# Patient Record
Sex: FEMALE | Race: WHITE | NOT HISPANIC OR LATINO | URBAN - METROPOLITAN AREA
[De-identification: names, ages, dates, MRNs, and addresses within clinical notes are randomized per-mention and may not be internally consistent; named-entity substitution may affect disease eponyms.]

---

## 2017-02-24 ENCOUNTER — EMERGENCY (EMERGENCY)
Facility: HOSPITAL | Age: 62
LOS: 1 days | Discharge: HOME | End: 2017-02-24
Attending: EMERGENCY MEDICINE | Admitting: EMERGENCY MEDICINE
Payer: COMMERCIAL

## 2017-02-24 VITALS
SYSTOLIC BLOOD PRESSURE: 141 MMHG | OXYGEN SATURATION: 100 % | RESPIRATION RATE: 18 BRPM | TEMPERATURE: 98 F | DIASTOLIC BLOOD PRESSURE: 72 MMHG | HEART RATE: 97 BPM

## 2017-02-24 LAB
ALBUMIN SERPL ELPH-MCNC: 4.1 G/DL — SIGNIFICANT CHANGE UP (ref 3.3–5)
ALP SERPL-CCNC: 51 U/L — SIGNIFICANT CHANGE UP (ref 40–120)
ALT FLD-CCNC: 20 U/L — SIGNIFICANT CHANGE UP (ref 4–33)
AST SERPL-CCNC: 25 U/L — SIGNIFICANT CHANGE UP (ref 4–32)
BASOPHILS # BLD AUTO: 0.02 K/UL — SIGNIFICANT CHANGE UP (ref 0–0.2)
BASOPHILS NFR BLD AUTO: 0.2 % — SIGNIFICANT CHANGE UP (ref 0–2)
BILIRUB SERPL-MCNC: 0.2 MG/DL — SIGNIFICANT CHANGE UP (ref 0.2–1.2)
BUN SERPL-MCNC: 33 MG/DL — HIGH (ref 7–23)
CALCIUM SERPL-MCNC: 9.7 MG/DL — SIGNIFICANT CHANGE UP (ref 8.4–10.5)
CHLORIDE SERPL-SCNC: 104 MMOL/L — SIGNIFICANT CHANGE UP (ref 98–107)
CO2 SERPL-SCNC: 23 MMOL/L — SIGNIFICANT CHANGE UP (ref 22–31)
CREAT SERPL-MCNC: 1.09 MG/DL — SIGNIFICANT CHANGE UP (ref 0.5–1.3)
EOSINOPHIL # BLD AUTO: 0.02 K/UL — SIGNIFICANT CHANGE UP (ref 0–0.5)
EOSINOPHIL NFR BLD AUTO: 0.2 % — SIGNIFICANT CHANGE UP (ref 0–6)
GLUCOSE SERPL-MCNC: 119 MG/DL — HIGH (ref 70–99)
HCT VFR BLD CALC: 35.9 % — SIGNIFICANT CHANGE UP (ref 34.5–45)
HGB BLD-MCNC: 11.8 G/DL — SIGNIFICANT CHANGE UP (ref 11.5–15.5)
IMM GRANULOCYTES NFR BLD AUTO: 0.3 % — SIGNIFICANT CHANGE UP (ref 0–1.5)
LYMPHOCYTES # BLD AUTO: 2.45 K/UL — SIGNIFICANT CHANGE UP (ref 1–3.3)
LYMPHOCYTES # BLD AUTO: 25.8 % — SIGNIFICANT CHANGE UP (ref 13–44)
MCHC RBC-ENTMCNC: 29.1 PG — SIGNIFICANT CHANGE UP (ref 27–34)
MCHC RBC-ENTMCNC: 32.9 % — SIGNIFICANT CHANGE UP (ref 32–36)
MCV RBC AUTO: 88.4 FL — SIGNIFICANT CHANGE UP (ref 80–100)
MONOCYTES # BLD AUTO: 0.4 K/UL — SIGNIFICANT CHANGE UP (ref 0–0.9)
MONOCYTES NFR BLD AUTO: 4.2 % — SIGNIFICANT CHANGE UP (ref 2–14)
NEUTROPHILS # BLD AUTO: 6.58 K/UL — SIGNIFICANT CHANGE UP (ref 1.8–7.4)
NEUTROPHILS NFR BLD AUTO: 69.3 % — SIGNIFICANT CHANGE UP (ref 43–77)
PLATELET # BLD AUTO: 237 K/UL — SIGNIFICANT CHANGE UP (ref 150–400)
PMV BLD: 11.3 FL — SIGNIFICANT CHANGE UP (ref 7–13)
POTASSIUM SERPL-MCNC: 4.1 MMOL/L — SIGNIFICANT CHANGE UP (ref 3.5–5.3)
POTASSIUM SERPL-SCNC: 4.1 MMOL/L — SIGNIFICANT CHANGE UP (ref 3.5–5.3)
PROT SERPL-MCNC: 7.1 G/DL — SIGNIFICANT CHANGE UP (ref 6–8.3)
RBC # BLD: 4.06 M/UL — SIGNIFICANT CHANGE UP (ref 3.8–5.2)
RBC # FLD: 13.2 % — SIGNIFICANT CHANGE UP (ref 10.3–14.5)
SODIUM SERPL-SCNC: 141 MMOL/L — SIGNIFICANT CHANGE UP (ref 135–145)
WBC # BLD: 9.5 K/UL — SIGNIFICANT CHANGE UP (ref 3.8–10.5)
WBC # FLD AUTO: 9.5 K/UL — SIGNIFICANT CHANGE UP (ref 3.8–10.5)

## 2017-02-24 PROCEDURE — 99284 EMERGENCY DEPT VISIT MOD MDM: CPT

## 2017-02-24 NOTE — ED PROVIDER NOTE - MEDICAL DECISION MAKING DETAILS
60 y/o F pt presenting with acute hearing loss x2 days. Concerning for medication side effect vs infection.

## 2017-02-24 NOTE — ED PROVIDER NOTE - PROGRESS NOTE DETAILS
Kenneth Parks MD PGY2: The scribe's documentation has been prepared under my direction and personally reviewed by me in its entirety. I confirm that the note above accurately reflects all work, treatment, procedures, and medical decision making performed by me. Seen by ENT who recommended c/w steroids at home, f/u with ENT on Monday, will d/c

## 2017-02-24 NOTE — ED PROVIDER NOTE - NS ED MD SCRIBE ATTENDING SCRIBE SECTIONS
PHYSICAL EXAM/VITAL SIGNS( Pullset)/HISTORY OF PRESENT ILLNESS/REVIEW OF SYSTEMS/HIV/DISPOSITION/PAST MEDICAL/SURGICAL/SOCIAL HISTORY

## 2017-02-24 NOTE — ED PROVIDER NOTE - NEURO NEGATIVE STATEMENT, MLM
no loss of consciousness, no gait abnormality, no headache, no sensory deficits, and no weakness. No numbness, no dizziness and no difficulty walking.

## 2017-02-24 NOTE — ED ADULT TRIAGE NOTE - CHIEF COMPLAINT QUOTE
Pt c/o hearing loss since wednesday was given medication in [facility] with no relief, pt states unable to hear. Pt was given cipro ear drops and prednisone. Pt denies any pain.

## 2017-02-24 NOTE — ED PROVIDER NOTE - ATTENDING CONTRIBUTION TO CARE
SHANITA Dumont MD: Patient seen and evaluated, presents to the ED with 2 days of left ear hearing loss, happened suddenly, no pain or trauma, no congestion, no numbness or weakness, no other complaints. Pt went to urgent care and was given prednisone and cipro drops, but it still continues. PE right TM dull with some visible vessels. Will get ENT to see patient.

## 2017-02-24 NOTE — ED PROVIDER NOTE - OBJECTIVE STATEMENT
60 y/o F pt with no significant PMHx presents to the ED for acute hearing loss x2 days. States waking up Wednesday morning and couldn't hear out of right ear. Patient was evaluated at an urgent care and prescribed steroids and Cipro ear drops which provided no relief. Patient continues to have hearing loss of right ear. Denies fever, chills, cough, CP, SOB, HA, visual changes, weakness, numbness, dizziness, difficulty walking or any other complaints. 62 y/o F pt with no significant PMHx presents to the ED for acute hearing loss x2 days. States waking up Wednesday morning and couldn't hear out of right ear. Patient was evaluated at an urgent care and prescribed steroids and Cipro ear drops which provided no relief. Patient continues to have hearing loss of right ear. Denies fever, chills, cough, CP, SOB, HA, visual changes, weakness, numbness, dizziness, difficulty walking, aspirin use or any other complaints.

## 2017-02-24 NOTE — ED PROVIDER NOTE - CHPI ED SYMPTOMS NEG
no weakness/no cough, no CP, no SOB, no HA, no visual changes, no dizziness, no difficulty walking/no numbness/no fever/no chills

## 2022-10-05 NOTE — ED ADULT TRIAGE NOTE - MODE OF ARRIVAL
Orders for admission, patient is aware of plan and ready to go upstairs. Any questions, please call ED RN Pinky Eric at extension [phone].      Patient Covid vaccination status: Unvaccinated     COVID Test Ordered in ED: Rapid SARS-CoV-2 by PCR    COVID Suspicion at Admission: Low clinical suspicion for COVID    Running Infusions:      Mental Status/LOC at time of transport: AOX3    Other pertinent information:   CIWA score: N/A   NIH score:  N/A Private Vehicle

## 2022-11-16 ENCOUNTER — APPOINTMENT (OUTPATIENT)
Dept: ORTHOPEDIC SURGERY | Facility: CLINIC | Age: 67
End: 2022-11-16

## 2022-11-16 PROBLEM — Z00.00 ENCOUNTER FOR PREVENTIVE HEALTH EXAMINATION: Status: ACTIVE | Noted: 2022-11-16

## 2022-11-16 PROCEDURE — 99203 OFFICE O/P NEW LOW 30 MIN: CPT | Mod: 25

## 2022-11-16 PROCEDURE — 73564 X-RAY EXAM KNEE 4 OR MORE: CPT | Mod: RT

## 2022-11-16 PROCEDURE — 20610 DRAIN/INJ JOINT/BURSA W/O US: CPT | Mod: LT

## 2022-11-16 NOTE — IMAGING
[de-identified] : RIGHT Knee Exam\par Alignment:  Neutral \par Effusion: None\par Atrophy: None                                                \par Stable to Varus/valgus stress\par Posterior Drawer Test: negative\par Anterior Drawer Test: Negative\par Knee Extension/Flexion: 0 / 120\par \par Medial/lateral compartments\par Medial joint line: No Tenderness\par Lateral joint line: No Tenderness\par Leslie test: negative\par \par Patellofemoral joint\par Medial patellar facet: no tenderness\par Patellar grind: Negative\par \par Tendons:\par Pes Anserine: No tenderness\par Gerdy’s Tubercle/ IT Band: No tenderness\par Quadriceps Tendon: No Tenderness\par patellar tendon: no Tenderness\par Tibial tubercle: not tenderness\par Calf: no Tenderness\par \par Neurovascular exam\par Muscle strength: 5/5\par Sensation to light touch: intact\par Distal pulses: 2+\par Ligamentously stable \par \par LEFT Knee Exam\par Alignment: Neutral \par Effusion: None\par Atrophy: None                                                \par Stable to Varus/valgus stress\par Posterior Drawer Test: negative\par Anterior Drawer Test: Negative\par Knee Extension/Flexion: 0 / 120\par \par Medial/lateral compartments\par Medial joint line: POS Tenderness\par Lateral joint line: No Tenderness\par Leslie test: negative\par \par Patellofemoral joint\par Medial patellar facet: no tenderness\par Patellar grind: Negative\par \par Tendons:\par Pes Anserine: No tenderness\par Gerdy’s Tubercle/ IT Band: No tenderness\par Quadriceps Tendon: No Tenderness\par patellar tendon: no Tenderness\par Tibial tubercle: not tenderness\par Calf: no Tenderness\par \par Neurovascular exam\par Muscle strength: 5/5\par Sensation to light touch: intact\par Distal pulses: 2+\par \par IMAGING:\par 11/16/22: XRays of b/l knees showing R knee with construct in place without osteolysis, L knee with severe bone on bone arthritis, medial joint space collapse with osteophytes and sclerosis

## 2022-11-16 NOTE — PROCEDURE
[FreeTextEntry3] : Large joint corticosteroid injection given: L Knee\par \par Patient indicated for injection after trial of rest, OTC medications including aspirin, Ibuprofen, Aleve etc or prescription NSAIDS, and/or exercises at home and/ or physical therapy without satisfactory response.  Patient has symptoms including pain, swelling, and/or decreased mobility in the joint. The risks, benefits, and alternatives to corticosteroid injection were explained in full to the patient, including but not limited to infection, sepsis, bleeding, scarring, skin discoloration, temporary increase in pain, syncopal episode, failure to resolve symptoms, allergic reaction, symptom recurrence, and elevation of blood sugar in diabetics. Patient understood the risks. All questions were answered. After discussion of options, patient requested an injection. \par \par Oral informed consent was obtained and sterile technique was utilized for the procedure including the preparation of the solutions used for the injection followed by alcohol prep to the injection site. Anesthesia was given with ethyl chloride sprayed topically. The injection was delivered. Patient tolerated the procedure well. \par \par Post Procedure Instructions: Patient was advised to call if redness, pain, or fever occur and apply ice for 15 min on and 15 min off later today\par \par Medications delivered: Kenalo mg, Lidocaine: 4 cc\par

## 2022-11-16 NOTE — HISTORY OF PRESENT ILLNESS
[8] : 8 [Dull/Aching] : dull/aching [Sharp] : sharp [Constant] : constant [Meds] : meds [Stairs] : stairs [] : yes [de-identified] : 11/16/2022; BRICE 67 year old F here for Bilateral knee pain, R> L. Had a TKR in 5/2021 at [facility]. Originally states the pain had initially improved but had restarted five months later. Had bloodwork taken w/o signs of infection. Has severe pain. Takes advil for the pain. Has pain with stairs and after driving. As well as pain with walking. \par \par CRP: 5 and NICOLE <1 \par \par   [FreeTextEntry1] : Bilateral Knee [de-identified] : 8/2022 [de-identified] : outside provider  [de-identified] : 8/2022 [de-identified] : 2022

## 2022-11-16 NOTE — ASSESSMENT
[FreeTextEntry1] : 68 y/o F s/p R TKR and L knee OA\par \par -CSI given to L knee and tolerated well\par -NM Bone scan of R knee to r/o loosening\par -Follow up after Bone scan

## 2023-01-04 ENCOUNTER — APPOINTMENT (OUTPATIENT)
Dept: ORTHOPEDIC SURGERY | Facility: CLINIC | Age: 68
End: 2023-01-04
Payer: MEDICARE

## 2023-01-04 PROCEDURE — 99214 OFFICE O/P EST MOD 30 MIN: CPT | Mod: 25

## 2023-01-04 PROCEDURE — 20610 DRAIN/INJ JOINT/BURSA W/O US: CPT | Mod: LT

## 2023-01-04 NOTE — ASSESSMENT
[FreeTextEntry1] : 66 y/o F s/p R TKR and L knee OA\par \par -CSI given to L knee and tolerated well\par -NM Bone scan of R knee to r/o loosening\par -Follow up after Bone scan\par \par 01/04/2023 bone scan negative. patient has atrophy and ann marie spur over the proximal pole of the patella. MRI to r/o partial tearing of quadriceps tendon.

## 2023-01-04 NOTE — IMAGING
[de-identified] : RIGHT Knee Exam\par Alignment:  Neutral \par Effusion: None\par Atrophy: None                                                \par Stable to Varus/valgus stress\par Posterior Drawer Test: negative\par Anterior Drawer Test: Negative\par Knee Extension/Flexion: 0 / 120\par atrophy over quadriceps tendon\par \par Medial/lateral compartments\par Medial joint line: No Tenderness\par Lateral joint line: No Tenderness\par Leslie test: negative\par \par Patellofemoral joint\par Medial patellar facet: no tenderness\par Patellar grind: Negative\par \par Tendons:\par Pes Anserine: No tenderness\par Gerdy’s Tubercle/ IT Band: No tenderness\par Quadriceps Tendon: No Tenderness\par patellar tendon: no Tenderness\par Tibial tubercle: not tenderness\par Calf: no Tenderness\par \par Neurovascular exam\par Muscle strength: 5/5\par Sensation to light touch: intact\par Distal pulses: 2+\par Ligamentously stable \par \par LEFT Knee Exam\par Alignment: Neutral \par Effusion: None\par Atrophy: None                                                \par Stable to Varus/valgus stress\par Posterior Drawer Test: negative\par Anterior Drawer Test: Negative\par Knee Extension/Flexion: 0 / 120\par \par Medial/lateral compartments\par Medial joint line: POS Tenderness\par Lateral joint line: No Tenderness\par Leslie test: negative\par \par Patellofemoral joint\par Medial patellar facet: no tenderness\par Patellar grind: Negative\par \par Tendons:\par Pes Anserine: No tenderness\par Gerdy’s Tubercle/ IT Band: No tenderness\par Quadriceps Tendon: No Tenderness\par patellar tendon: no Tenderness\par Tibial tubercle: not tenderness\par Calf: no Tenderness\par \par Neurovascular exam\par Muscle strength: 5/5\par Sensation to light touch: intact\par Distal pulses: 2+\par \par IMAGING:\par 11/16/22: XRays of b/l knees showing R knee with construct in place without osteolysis, L knee with severe bone on bone arthritis, medial joint space collapse with osteophytes and sclerosis \par 01/04/2023 bone scan [facility] - no signs of loosening

## 2023-01-04 NOTE — HISTORY OF PRESENT ILLNESS
[8] : 8 [Dull/Aching] : dull/aching [Sharp] : sharp [Constant] : constant [Meds] : meds [Stairs] : stairs [] : yes [de-identified] : 11/16/2022; BRICE 67 year old F here for Bilateral knee pain, R> L. Had a TKR in 5/2021 at [place]. Originally states the pain had initially improved but had restarted five months later. Had bloodwork taken w/o signs of infection. Has severe pain. Takes advil for the pain. Has pain with stairs and after driving. As well as pain with walking. \par \par CRP: 5 and NICOLE <1 \par \par 01/04/2023, BRICE 67 year old F here for Bone SCAN results, study completed at [facility], reports on going symptoms \par   [FreeTextEntry1] : Bilateral Knee [de-identified] : 8/2022 [de-identified] : outside provider  [de-identified] : 8/2022 [de-identified] : 2022

## 2023-01-12 ENCOUNTER — FORM ENCOUNTER (OUTPATIENT)
Age: 68
End: 2023-01-12

## 2023-01-13 ENCOUNTER — APPOINTMENT (OUTPATIENT)
Dept: MRI IMAGING | Facility: CLINIC | Age: 68
End: 2023-01-13
Payer: MEDICARE

## 2023-01-13 PROCEDURE — 73721 MRI JNT OF LWR EXTRE W/O DYE: CPT | Mod: RT,MH

## 2023-01-18 ENCOUNTER — APPOINTMENT (OUTPATIENT)
Dept: ORTHOPEDIC SURGERY | Facility: CLINIC | Age: 68
End: 2023-01-18
Payer: MEDICARE

## 2023-01-18 DIAGNOSIS — Z96.651 PRESENCE OF RIGHT ARTIFICIAL KNEE JOINT: ICD-10-CM

## 2023-01-18 PROCEDURE — 99213 OFFICE O/P EST LOW 20 MIN: CPT

## 2023-01-18 NOTE — ASSESSMENT
[FreeTextEntry1] : 66 y/o F s/p R TKR and L knee OA\par \par -CSI given to L knee and tolerated well\par -NM Bone scan of R knee to r/o loosening\par -Follow up after Bone scan\par \par 01/04/2023 bone scan negative. patient has atrophy and ann marie spur over the proximal pole of the patella. MRI to r/o partial tearing of quadriceps tendon.\par \par 01/18/2023 normal MRI, mild quad tendonitis. continue PT and PRN fu for CSI

## 2023-01-18 NOTE — IMAGING
[de-identified] : RIGHT Knee Exam\par Alignment:  Neutral \par Effusion: None\par Atrophy: None                                                \par Stable to Varus/valgus stress\par Posterior Drawer Test: negative\par Anterior Drawer Test: Negative\par Knee Extension/Flexion: 0 / 120\par atrophy over quadriceps tendon\par \par Medial/lateral compartments\par Medial joint line: No Tenderness\par Lateral joint line: No Tenderness\par Leslie test: negative\par \par Patellofemoral joint\par Medial patellar facet: no tenderness\par Patellar grind: Negative\par \par Tendons:\par Pes Anserine: No tenderness\par Gerdy’s Tubercle/ IT Band: No tenderness\par Quadriceps Tendon: No Tenderness\par patellar tendon: no Tenderness\par Tibial tubercle: not tenderness\par Calf: no Tenderness\par \par Neurovascular exam\par Muscle strength: 5/5\par Sensation to light touch: intact\par Distal pulses: 2+\par Ligamentously stable \par \par LEFT Knee Exam\par Alignment: Neutral \par Effusion: None\par Atrophy: None                                                \par Stable to Varus/valgus stress\par Posterior Drawer Test: negative\par Anterior Drawer Test: Negative\par Knee Extension/Flexion: 0 / 120\par \par Medial/lateral compartments\par Medial joint line: POS Tenderness\par Lateral joint line: No Tenderness\par Leslie test: negative\par \par Patellofemoral joint\par Medial patellar facet: no tenderness\par Patellar grind: Negative\par \par Tendons:\par Pes Anserine: No tenderness\par Gerdy’s Tubercle/ IT Band: No tenderness\par Quadriceps Tendon: No Tenderness\par patellar tendon: no Tenderness\par Tibial tubercle: not tenderness\par Calf: no Tenderness\par \par Neurovascular exam\par Muscle strength: 5/5\par Sensation to light touch: intact\par Distal pulses: 2+\par \par IMAGING:\par 11/16/22: XRays of b/l knees showing R knee with construct in place without osteolysis, L knee with severe bone on bone arthritis, medial joint space collapse with osteophytes and sclerosis \par 01/04/2023 bone scan [facility] - no signs of loosening\par 01/18/2023 1. Total knee arthroplasty with no osteolysis, fracture, or pseudotumor.\par 2. Joint effusion.\par 3. Quadriceps insertional tendinopathy and thickening with postsurgical change versus chronic low-grade\par longitudinal insertional tears.

## 2023-01-18 NOTE — HISTORY OF PRESENT ILLNESS
[8] : 8 [Dull/Aching] : dull/aching [Sharp] : sharp [Constant] : constant [Meds] : meds [Stairs] : stairs [] : yes [de-identified] : 11/16/2022; BRICE 67 year old F here for Bilateral knee pain, R> L. Had a TKR in 5/2021 at [place]. Originally states the pain had initially improved but had restarted five months later. Had bloodwork taken w/o signs of infection. Has severe pain. Takes advil for the pain. Has pain with stairs and after driving. As well as pain with walking. \par \par CRP: 5 and INCOLE <1 \par \par 01/04/2023, BRICE 67 year old F here for Bone SCAN results, study completed at [facility], reports on going symptoms \par \par 01/18/2023 BRIEC 67 year old F here for MRI review of the RT knee, reports a slight decrease in pain today \par   [FreeTextEntry1] : Bilateral Knee [de-identified] : 8/2022 [de-identified] : outside provider  [de-identified] : 8/2022 [de-identified] : 2022

## 2023-04-05 ENCOUNTER — APPOINTMENT (OUTPATIENT)
Dept: ORTHOPEDIC SURGERY | Facility: CLINIC | Age: 68
End: 2023-04-05
Payer: MEDICARE

## 2023-04-05 PROCEDURE — 20610 DRAIN/INJ JOINT/BURSA W/O US: CPT | Mod: LT

## 2023-04-05 PROCEDURE — 99214 OFFICE O/P EST MOD 30 MIN: CPT | Mod: 25

## 2023-04-05 NOTE — ASSESSMENT
[FreeTextEntry1] : 68 y/o F s/p R TKR and L knee OA\par \par -CSI given to L knee and tolerated well\par -NM Bone scan of R knee to r/o loosening\par -Follow up after Bone scan\par \par 01/04/2023 bone scan negative. patient has atrophy and ann marie spur over the proximal pole of the patella. MRI to r/o partial tearing of quadriceps tendon.\par \par 01/18/2023 normal MRI, mild quad tendonitis. continue PT and PRN fu for CSI\par \par 4/5/23: CSI L knee, follow up PRN

## 2023-04-05 NOTE — HISTORY OF PRESENT ILLNESS
[8] : 8 [Dull/Aching] : dull/aching [Sharp] : sharp [Constant] : constant [Meds] : meds [Stairs] : stairs [] : yes [de-identified] : 11/16/2022; BRICE 67 year old F here for Bilateral knee pain, R> L. Had a TKR in 5/2021 at [place]. Originally states the pain had initially improved but had restarted five months later. Had bloodwork taken w/o signs of infection. Has severe pain. Takes advil for the pain. Has pain with stairs and after driving. As well as pain with walking. \par \par CRP: 5 and NICOLE <1 \par \par 01/04/2023, BRICE 67 year old F here for Bone SCAN results, study completed at [facility], reports on going symptoms \par \par 01/18/2023 BRICE 67 year old F here for MRI review of the RT knee, reports a slight decrease in pain today \par  \par 04/05/2023 BRICE 68 year old F here for follow up LT knee, reports her knee was improving, her pain started aching almost 1 week ago [FreeTextEntry1] : Bilateral Knee [de-identified] : 8/2022 [de-identified] : outside provider  [de-identified] : 8/2022 [de-identified] : 2022

## 2023-04-05 NOTE — IMAGING
[de-identified] : RIGHT Knee Exam\par Alignment:  Neutral \par Effusion: None\par Atrophy: None                                                \par Stable to Varus/valgus stress\par Posterior Drawer Test: negative\par Anterior Drawer Test: Negative\par Knee Extension/Flexion: 0 / 120\par atrophy over quadriceps tendon\par \par Medial/lateral compartments\par Medial joint line: No Tenderness\par Lateral joint line: No Tenderness\par Leslie test: negative\par \par Patellofemoral joint\par Medial patellar facet: no tenderness\par Patellar grind: Negative\par \par Tendons:\par Pes Anserine: No tenderness\par Gerdy’s Tubercle/ IT Band: No tenderness\par Quadriceps Tendon: No Tenderness\par patellar tendon: no Tenderness\par Tibial tubercle: not tenderness\par Calf: no Tenderness\par \par Neurovascular exam\par Muscle strength: 5/5\par Sensation to light touch: intact\par Distal pulses: 2+\par Ligamentously stable \par \par LEFT Knee Exam\par Alignment: Neutral \par Effusion: None\par Atrophy: None                                                \par Stable to Varus/valgus stress\par Posterior Drawer Test: negative\par Anterior Drawer Test: Negative\par Knee Extension/Flexion: 0 / 120\par \par Medial/lateral compartments\par Medial joint line: POS Tenderness\par Lateral joint line: No Tenderness\par Leslie test: negative\par \par Patellofemoral joint\par Medial patellar facet: no tenderness\par Patellar grind: Negative\par \par Tendons:\par Pes Anserine: No tenderness\par Gerdy’s Tubercle/ IT Band: No tenderness\par Quadriceps Tendon: No Tenderness\par patellar tendon: no Tenderness\par Tibial tubercle: not tenderness\par Calf: no Tenderness\par \par Neurovascular exam\par Muscle strength: 5/5\par Sensation to light touch: intact\par Distal pulses: 2+\par \par IMAGING:\par 11/16/22: XRays of b/l knees showing R knee with construct in place without osteolysis, L knee with severe bone on bone arthritis, medial joint space collapse with osteophytes and sclerosis \par 01/04/2023 bone scan [facility] - no signs of loosening\par 01/18/2023 1. Total knee arthroplasty with no osteolysis, fracture, or pseudotumor.\par 2. Joint effusion.\par 3. Quadriceps insertional tendinopathy and thickening with postsurgical change versus chronic low-grade\par longitudinal insertional tears.

## 2023-12-13 ENCOUNTER — APPOINTMENT (OUTPATIENT)
Dept: ORTHOPEDIC SURGERY | Facility: CLINIC | Age: 68
End: 2023-12-13
Payer: MEDICARE

## 2023-12-13 VITALS — WEIGHT: 185 LBS | HEIGHT: 69 IN | BODY MASS INDEX: 27.4 KG/M2

## 2023-12-13 DIAGNOSIS — Z78.9 OTHER SPECIFIED HEALTH STATUS: ICD-10-CM

## 2023-12-13 PROCEDURE — 20610 DRAIN/INJ JOINT/BURSA W/O US: CPT | Mod: LT

## 2023-12-13 PROCEDURE — 99213 OFFICE O/P EST LOW 20 MIN: CPT | Mod: 25

## 2023-12-13 NOTE — HISTORY OF PRESENT ILLNESS
[8] : 8 [Dull/Aching] : dull/aching [Sharp] : sharp [Constant] : constant [Meds] : meds [Stairs] : stairs [] : yes [de-identified] : 11/16/2022; BRICE 67 year old F here for Bilateral knee pain, R> L. Had a TKR in 5/2021 at [place]. Originally states the pain had initially improved but had restarted five months later. Had bloodwork taken w/o signs of infection. Has severe pain. Takes advil for the pain. Has pain with stairs and after driving. As well as pain with walking.   CRP: 5 and NICOLE <1   01/04/2023, BRICE 67 year old F here for Bone SCAN results, study completed at [facility], reports on going symptoms   01/18/2023 BRICE 67 year old F here for MRI review of the RT knee, reports a slight decrease in pain today    04/05/2023 BRICE 68 year old F here for follow up LT knee, reports her knee was improving, her pain started aching almost 1 week ago.   12/13/2023 CSI last 4 months prev. [FreeTextEntry1] : Bilateral Knee [de-identified] : 8/2022 [de-identified] : outside provider  [de-identified] : 8/2022 [de-identified] : 2022

## 2023-12-13 NOTE — IMAGING
[de-identified] : RIGHT Knee Exam Alignment:  Neutral  Effusion: None Atrophy: None                                                 Stable to Varus/valgus stress Posterior Drawer Test: negative Anterior Drawer Test: Negative Knee Extension/Flexion: 0 / 120 atrophy over quadriceps tendon  Medial/lateral compartments Medial joint line: No Tenderness Lateral joint line: No Tenderness Leslie test: negative  Patellofemoral joint Medial patellar facet: no tenderness Patellar grind: Negative  Tendons: Pes Anserine: No tenderness Gerdy's Tubercle/ IT Band: No tenderness Quadriceps Tendon: No Tenderness patellar tendon: no Tenderness Tibial tubercle: not tenderness Calf: no Tenderness  Neurovascular exam Muscle strength: 5/5 Sensation to light touch: intact Distal pulses: 2+ Ligamentously stable   LEFT Knee Exam Alignment: Neutral  Effusion: None Atrophy: None                                                 Stable to Varus/valgus stress Posterior Drawer Test: negative Anterior Drawer Test: Negative Knee Extension/Flexion: 0 / 120  Medial/lateral compartments Medial joint line: POS Tenderness Lateral joint line: No Tenderness Leslie test: negative  Patellofemoral joint Medial patellar facet: no tenderness Patellar grind: Negative  Tendons: Pes Anserine: No tenderness Gerdy's Tubercle/ IT Band: No tenderness Quadriceps Tendon: No Tenderness patellar tendon: no Tenderness Tibial tubercle: not tenderness Calf: no Tenderness  Neurovascular exam Muscle strength: 5/5 Sensation to light touch: intact Distal pulses: 2+  IMAGIN22: XRays of b/l knees showing R knee with construct in place without osteolysis, L knee with severe bone on bone arthritis, medial joint space collapse with osteophytes and sclerosis  2023 bone scan [facility] - no signs of loosening 2023 1. Total knee arthroplasty with no osteolysis, fracture, or pseudotumor. 2. Joint effusion. 3. Quadriceps insertional tendinopathy and thickening with postsurgical change versus chronic low-grade longitudinal insertional tears.

## 2023-12-13 NOTE — PROCEDURE
[FreeTextEntry3] : Large joint corticosteroid injection given: L Knee  Patient indicated for injection after trial of rest, OTC medications including aspirin, Ibuprofen, Aleve etc or prescription NSAIDS, and/or exercises at home and/ or physical therapy without satisfactory response.  Patient has symptoms including pain, swelling, and/or decreased mobility in the joint. The risks, benefits, and alternatives to corticosteroid injection were explained in full to the patient, including but not limited to infection, sepsis, bleeding, scarring, skin discoloration, temporary increase in pain, syncopal episode, failure to resolve symptoms, allergic reaction, symptom recurrence, and elevation of blood sugar in diabetics. Patient understood the risks. All questions were answered. After discussion of options, patient requested an injection.   Oral informed consent was obtained and sterile technique was utilized for the procedure including the preparation of the solutions used for the injection followed by alcohol prep to the injection site. Anesthesia was given with ethyl chloride sprayed topically. The injection was delivered. Patient tolerated the procedure well.   Post Procedure Instructions: Patient was advised to call if redness, pain, or fever occur and apply ice for 15 min on and 15 min off later today  Medications delivered: Kenalo mg, Lidocaine: 4 cc

## 2024-04-22 ENCOUNTER — APPOINTMENT (OUTPATIENT)
Dept: ORTHOPEDIC SURGERY | Facility: CLINIC | Age: 69
End: 2024-04-22
Payer: MEDICARE

## 2024-04-22 DIAGNOSIS — M17.12 UNILATERAL PRIMARY OSTEOARTHRITIS, LEFT KNEE: ICD-10-CM

## 2024-04-22 PROCEDURE — 20610 DRAIN/INJ JOINT/BURSA W/O US: CPT | Mod: LT

## 2024-04-22 PROCEDURE — 99213 OFFICE O/P EST LOW 20 MIN: CPT | Mod: 25

## 2024-04-22 NOTE — IMAGING
[de-identified] : RIGHT Knee Exam Alignment:  Neutral  Effusion: None Atrophy: None                                                 Stable to Varus/valgus stress Posterior Drawer Test: negative Anterior Drawer Test: Negative Knee Extension/Flexion: 0 / 120 atrophy over quadriceps tendon  Medial/lateral compartments Medial joint line: No Tenderness Lateral joint line: No Tenderness Leslie test: negative  Patellofemoral joint Medial patellar facet: no tenderness Patellar grind: Negative  Tendons: Pes Anserine: No tenderness Gerdy's Tubercle/ IT Band: No tenderness Quadriceps Tendon: No Tenderness patellar tendon: no Tenderness Tibial tubercle: not tenderness Calf: no Tenderness  Neurovascular exam Muscle strength: 5/5 Sensation to light touch: intact Distal pulses: 2+ Ligamentously stable   LEFT Knee Exam Alignment: Neutral  Effusion: None Atrophy: None                                                 Stable to Varus/valgus stress Posterior Drawer Test: negative Anterior Drawer Test: Negative Knee Extension/Flexion: 0 / 120  Medial/lateral compartments Medial joint line: POS Tenderness Lateral joint line: No Tenderness Leslie test: negative  Patellofemoral joint Medial patellar facet: no tenderness Patellar grind: Negative  Tendons: Pes Anserine: No tenderness Gerdy's Tubercle/ IT Band: No tenderness Quadriceps Tendon: No Tenderness patellar tendon: no Tenderness Tibial tubercle: not tenderness Calf: no Tenderness  Neurovascular exam Muscle strength: 5/5 Sensation to light touch: intact Distal pulses: 2+  IMAGIN22: XRays of b/l knees showing R knee with construct in place without osteolysis, L knee with severe bone on bone arthritis, medial joint space collapse with osteophytes and sclerosis  2023 bone scan [facility] - no signs of loosening 2023 1. Total knee arthroplasty with no osteolysis, fracture, or pseudotumor. 2. Joint effusion. 3. Quadriceps insertional tendinopathy and thickening with postsurgical change versus chronic low-grade longitudinal insertional tears.

## 2024-04-22 NOTE — HISTORY OF PRESENT ILLNESS
[8] : 8 [Dull/Aching] : dull/aching [Sharp] : sharp [Constant] : constant [Meds] : meds [Stairs] : stairs [] : yes [de-identified] : 11/16/2022; BRICE 67 year old F here for Bilateral knee pain, R> L. Had a TKR in 5/2021 at [place]. Originally states the pain had initially improved but had restarted five months later. Had bloodwork taken w/o signs of infection. Has severe pain. Takes advil for the pain. Has pain with stairs and after driving. As well as pain with walking.   CRP: 5 and NICOLE <1   01/04/2023, BRICE 67 year old F here for Bone SCAN results, study completed at [facility], reports on going symptoms   01/18/2023 BRICE 67 year old F here for MRI review of the RT knee, reports a slight decrease in pain today    04/05/2023 BRICE 68 year old F here for follow up LT knee, reports her knee was improving, her pain started aching almost 1 week ago.   12/13/2023 CSI last 4 months prev.  04/22/2024: Patient is here today to follow up on bilateral knee pain, she reports that she has been experiencing stiffness in her left knee. Pt would like CSI today.  [FreeTextEntry1] : Bilateral Knee [de-identified] : 8/2022 [de-identified] : outside provider  [de-identified] : 8/2022 [de-identified] : 2022

## 2024-04-22 NOTE — ASSESSMENT
[FreeTextEntry1] : 68 y/o F s/p R TKR and L knee OA  -CSI given to L knee and tolerated well -NM Bone scan of R knee to r/o loosening -Follow up after Bone scan  01/04/2023 bone scan negative. patient has atrophy and an nmarie spur over the proximal pole of the patella. MRI to r/o partial tearing of quadriceps tendon.  01/18/2023 normal MRI, mild quad tendonitis. continue PT and PRN fu for CSI 4/5/23: CSI L knee, follow up PRN. 12/13/2023 CSI of Left knee today and 3 month fu 04/22/2024 CSI of the left knee today and 3 month fu

## 2024-07-22 ENCOUNTER — APPOINTMENT (OUTPATIENT)
Dept: ORTHOPEDIC SURGERY | Facility: CLINIC | Age: 69
End: 2024-07-22
Payer: MEDICARE

## 2024-07-22 DIAGNOSIS — M17.12 UNILATERAL PRIMARY OSTEOARTHRITIS, LEFT KNEE: ICD-10-CM

## 2024-07-22 PROCEDURE — 20610 DRAIN/INJ JOINT/BURSA W/O US: CPT | Mod: LT

## 2024-07-22 PROCEDURE — 99213 OFFICE O/P EST LOW 20 MIN: CPT | Mod: 25

## 2024-07-22 NOTE — HISTORY OF PRESENT ILLNESS
[8] : 8 [Dull/Aching] : dull/aching [Sharp] : sharp [Constant] : constant [Meds] : meds [Stairs] : stairs [] : yes [de-identified] : 11/16/2022; BRICE 67 year old F here for Bilateral knee pain, R> L. Had a TKR in 5/2021 at [facility]. Originally states the pain had initially improved but had restarted five months later. Had bloodwork taken w/o signs of infection. Has severe pain. Takes advil for the pain. Has pain with stairs and after driving. As well as pain with walking.   CRP: 5 and NICOLE <1   01/04/2023, BRICE 67 year old F here for Bone SCAN results, study completed at [facility], reports on going symptoms   01/18/2023 BRICE 67 year old F here for MRI review of the RT knee, reports a slight decrease in pain today    04/05/2023 BRICE 68 year old F here for follow up LT knee, reports her knee was improving, her pain started aching almost 1 week ago.   12/13/2023 CSI last 4 months prev.  04/22/2024: Patient is here today to follow up on bilateral knee pain, she reports that she has been experiencing stiffness in her left knee. Pt would like CSI today.   07/22/2024: BRICE is here today for left knee follow up. states CSI gave good relief. she is requesting to repeat left knee CSI.  [FreeTextEntry1] : Bilateral Knee [de-identified] : 8/2022 [de-identified] : outside provider  [de-identified] : 8/2022 [de-identified] : 2022

## 2024-07-22 NOTE — ASSESSMENT
[FreeTextEntry1] : 68 y/o F s/p R TKR and L knee OA  -CSI given to L knee and tolerated well -NM Bone scan of R knee to r/o loosening -Follow up after Bone scan  01/04/2023 bone scan negative. patient has atrophy and ann marie spur over the proximal pole of the patella. MRI to r/o partial tearing of quadriceps tendon.  01/18/2023 normal MRI, mild quad tendonitis. continue PT and PRN fu for CSI 4/5/23: CSI L knee, follow up PRN. 12/13/2023 CSI of Left knee today and 3 month fu 04/22/2024 CSI of the left knee today and 3 month fu 07/22/2024 CSI of the left knee today and 3 month fu

## 2024-07-22 NOTE — IMAGING
[de-identified] : RIGHT Knee Exam Alignment:  Neutral  Effusion: None Atrophy: None                                                 Stable to Varus/valgus stress Posterior Drawer Test: negative Anterior Drawer Test: Negative Knee Extension/Flexion: 0 / 120 atrophy over quadriceps tendon  Medial/lateral compartments Medial joint line: No Tenderness Lateral joint line: No Tenderness Leslie test: negative  Patellofemoral joint Medial patellar facet: no tenderness Patellar grind: Negative  Tendons: Pes Anserine: No tenderness Gerdy's Tubercle/ IT Band: No tenderness Quadriceps Tendon: No Tenderness patellar tendon: no Tenderness Tibial tubercle: not tenderness Calf: no Tenderness  Neurovascular exam Muscle strength: 5/5 Sensation to light touch: intact Distal pulses: 2+ Ligamentously stable   LEFT Knee Exam Alignment: Neutral  Effusion: None Atrophy: None                                                 Stable to Varus/valgus stress Posterior Drawer Test: negative Anterior Drawer Test: Negative Knee Extension/Flexion: 0 / 120  Medial/lateral compartments Medial joint line: POS Tenderness Lateral joint line: No Tenderness Leslie test: negative  Patellofemoral joint Medial patellar facet: no tenderness Patellar grind: Negative  Tendons: Pes Anserine: No tenderness Gerdy's Tubercle/ IT Band: No tenderness Quadriceps Tendon: No Tenderness patellar tendon: no Tenderness Tibial tubercle: not tenderness Calf: no Tenderness  Neurovascular exam Muscle strength: 5/5 Sensation to light touch: intact Distal pulses: 2+  IMAGIN22: XRays of b/l knees showing R knee with construct in place without osteolysis, L knee with severe bone on bone arthritis, medial joint space collapse with osteophytes and sclerosis  2023 bone scan [facility] - no signs of loosening 2023 1. Total knee arthroplasty with no osteolysis, fracture, or pseudotumor. 2. Joint effusion. 3. Quadriceps insertional tendinopathy and thickening with postsurgical change versus chronic low-grade longitudinal insertional tears.

## 2024-11-04 ENCOUNTER — APPOINTMENT (OUTPATIENT)
Dept: ORTHOPEDIC SURGERY | Facility: CLINIC | Age: 69
End: 2024-11-04
Payer: MEDICARE

## 2024-11-04 DIAGNOSIS — M17.12 UNILATERAL PRIMARY OSTEOARTHRITIS, LEFT KNEE: ICD-10-CM

## 2024-11-04 PROCEDURE — 99213 OFFICE O/P EST LOW 20 MIN: CPT | Mod: 25

## 2024-11-04 PROCEDURE — 20610 DRAIN/INJ JOINT/BURSA W/O US: CPT | Mod: LT

## 2025-03-10 ENCOUNTER — NON-APPOINTMENT (OUTPATIENT)
Age: 70
End: 2025-03-10

## 2025-04-04 ENCOUNTER — TRANSCRIPTION ENCOUNTER (OUTPATIENT)
Age: 70
End: 2025-04-04

## 2025-04-04 ENCOUNTER — APPOINTMENT (OUTPATIENT)
Dept: OTOLARYNGOLOGY | Facility: CLINIC | Age: 70
End: 2025-04-04
Payer: MEDICARE

## 2025-04-04 VITALS
DIASTOLIC BLOOD PRESSURE: 69 MMHG | HEIGHT: 69 IN | RESPIRATION RATE: 17 BRPM | WEIGHT: 168 LBS | HEART RATE: 79 BPM | SYSTOLIC BLOOD PRESSURE: 106 MMHG | OXYGEN SATURATION: 95 % | BODY MASS INDEX: 24.88 KG/M2

## 2025-04-04 DIAGNOSIS — Z78.9 OTHER SPECIFIED HEALTH STATUS: ICD-10-CM

## 2025-04-04 DIAGNOSIS — H90.3 SENSORINEURAL HEARING LOSS, BILATERAL: ICD-10-CM

## 2025-04-04 DIAGNOSIS — Z86.69 PERSONAL HISTORY OF OTHER DISEASES OF THE NERVOUS SYSTEM AND SENSE ORGANS: ICD-10-CM

## 2025-04-04 DIAGNOSIS — Z86.79 PERSONAL HISTORY OF OTHER DISEASES OF THE CIRCULATORY SYSTEM: ICD-10-CM

## 2025-04-04 DIAGNOSIS — Z86.39 PERSONAL HISTORY OF OTHER ENDOCRINE, NUTRITIONAL AND METABOLIC DISEASE: ICD-10-CM

## 2025-04-04 PROCEDURE — 99204 OFFICE O/P NEW MOD 45 MIN: CPT

## 2025-04-04 PROCEDURE — 92567 TYMPANOMETRY: CPT

## 2025-04-04 PROCEDURE — 92557 COMPREHENSIVE HEARING TEST: CPT

## 2025-04-04 RX ORDER — PANTOPRAZOLE SODIUM 40 MG/10ML
INJECTION, POWDER, FOR SOLUTION INTRAVENOUS
Refills: 0 | Status: ACTIVE | COMMUNITY

## 2025-04-04 RX ORDER — HYDROXYCHLOROQUINE SULFATE 400 MG/1
TABLET ORAL
Refills: 0 | Status: ACTIVE | COMMUNITY

## 2025-04-04 RX ORDER — LOSARTAN POTASSIUM 100 MG/1
TABLET, FILM COATED ORAL
Refills: 0 | Status: ACTIVE | COMMUNITY

## 2025-04-04 RX ORDER — PRAVASTATIN SODIUM 80 MG/1
TABLET ORAL
Refills: 0 | Status: ACTIVE | COMMUNITY

## 2025-05-14 ENCOUNTER — NON-APPOINTMENT (OUTPATIENT)
Age: 70
End: 2025-05-14

## 2025-05-16 ENCOUNTER — APPOINTMENT (OUTPATIENT)
Dept: PHARMACY | Facility: CLINIC | Age: 70
End: 2025-05-16
Payer: MEDICARE

## 2025-05-16 PROCEDURE — V5010 ASSESSMENT FOR HEARING AID: CPT | Mod: NC

## 2025-05-19 ENCOUNTER — APPOINTMENT (OUTPATIENT)
Dept: ORTHOPEDIC SURGERY | Facility: CLINIC | Age: 70
End: 2025-05-19
Payer: MEDICARE

## 2025-05-19 DIAGNOSIS — M17.12 UNILATERAL PRIMARY OSTEOARTHRITIS, LEFT KNEE: ICD-10-CM

## 2025-05-19 PROCEDURE — 99213 OFFICE O/P EST LOW 20 MIN: CPT | Mod: 25

## 2025-05-19 PROCEDURE — 20610 DRAIN/INJ JOINT/BURSA W/O US: CPT | Mod: LT

## 2025-06-26 ENCOUNTER — APPOINTMENT (OUTPATIENT)
Dept: PHARMACY | Facility: CLINIC | Age: 70
End: 2025-06-26
Payer: MEDICARE

## 2025-06-26 PROCEDURE — V5221F: CUSTOM

## 2025-07-03 ENCOUNTER — APPOINTMENT (OUTPATIENT)
Dept: PHARMACY | Facility: CLINIC | Age: 70
End: 2025-07-03
Payer: SELF-PAY

## 2025-07-03 PROCEDURE — V5299A: CUSTOM

## 2025-07-21 ENCOUNTER — APPOINTMENT (OUTPATIENT)
Dept: ORTHOPEDIC SURGERY | Facility: CLINIC | Age: 70
End: 2025-07-21
Payer: MEDICARE

## 2025-07-21 DIAGNOSIS — M17.12 UNILATERAL PRIMARY OSTEOARTHRITIS, LEFT KNEE: ICD-10-CM

## 2025-07-21 PROCEDURE — 99214 OFFICE O/P EST MOD 30 MIN: CPT

## 2025-07-21 RX ORDER — METHYLPREDNISOLONE 4 MG/1
4 TABLET ORAL
Qty: 1 | Refills: 1 | Status: ACTIVE | COMMUNITY
Start: 2025-07-21 | End: 1900-01-01

## 2025-09-08 ENCOUNTER — APPOINTMENT (OUTPATIENT)
Dept: ORTHOPEDIC SURGERY | Facility: CLINIC | Age: 70
End: 2025-09-08
Payer: MEDICARE

## 2025-09-08 PROCEDURE — 99213 OFFICE O/P EST LOW 20 MIN: CPT | Mod: 25

## 2025-09-08 PROCEDURE — 20610 DRAIN/INJ JOINT/BURSA W/O US: CPT | Mod: LT

## 2025-09-15 ENCOUNTER — APPOINTMENT (OUTPATIENT)
Dept: ORTHOPEDIC SURGERY | Facility: CLINIC | Age: 70
End: 2025-09-15

## 2025-09-15 DIAGNOSIS — M17.12 UNILATERAL PRIMARY OSTEOARTHRITIS, LEFT KNEE: ICD-10-CM
